# Patient Record
(demographics unavailable — no encounter records)

---

## 2025-06-26 NOTE — REASON FOR VISIT
[____ Week(s)] : [unfilled] week(s)  [Umbilical hernia repair] : umbilical hernia repair [Patient] : patient [Parents] : parents [Medical Records] : medical records [Pain] : ~He/She~ does not have pain [Fever] : ~He/She~ does not have fever [Redness at incision] : ~He/She~ does not have redness at incision [Drainage at incision] : ~He/She~ does not have drainage at incision [Swelling at surgical site] : ~He/She~ does not have swelling at surgical site [de-identified] : 6-12-25 [de-identified] : Dr Alvarado [de-identified] : Now 2 weeks post op from umbilical hernia repair.

## 2025-06-26 NOTE — ASSESSMENT
[FreeTextEntry1] : NAVNEET is a 7 year boy s/p recent umbilical hernia repair. He is doing well, no complaints of pain, tolerating a diet, and has no evidence of hernia on exam. All of His wounds have healed well. I discussed the lifelong small risk of recurrence. He is cleared to return to all of his normal activities without restrictions. While I do not need to see NAVNEET for routine follow-up, I would be happy to see him anytime should any questions or problems arise. All questions were answered.

## 2025-06-26 NOTE — CONSULT LETTER
[Dear  ___] : Dear  [unfilled], [Courtesy Letter:] : I had the pleasure of seeing your patient, [unfilled], in my office today. [Please see my note below.] : Please see my note below. [Consult Closing:] : Thank you very much for allowing me to participate in the care of this patient.  If you have any questions, please do not hesitate to contact me. [Sincerely,] : Sincerely, [FreeTextEntry2] : Dr Nuñez [FreeTextEntry3] : Philly Bella  MSN  CPNP Pediatric Nurse Practitioner Department of Pediatric Surgery [facility] phone [phone] fax [phone]